# Patient Record
Sex: MALE | URBAN - METROPOLITAN AREA
[De-identification: names, ages, dates, MRNs, and addresses within clinical notes are randomized per-mention and may not be internally consistent; named-entity substitution may affect disease eponyms.]

---

## 2022-09-06 ENCOUNTER — LAB OUTSIDE AN ENCOUNTER (OUTPATIENT)
Dept: URBAN - METROPOLITAN AREA CLINIC 115 | Facility: CLINIC | Age: 57
End: 2022-09-06

## 2022-09-06 ENCOUNTER — OFFICE VISIT (OUTPATIENT)
Dept: URBAN - METROPOLITAN AREA CLINIC 115 | Facility: CLINIC | Age: 57
End: 2022-09-06
Payer: SELF-PAY

## 2022-09-06 VITALS
RESPIRATION RATE: 16 BRPM | HEIGHT: 62 IN | TEMPERATURE: 98.3 F | BODY MASS INDEX: 31.21 KG/M2 | SYSTOLIC BLOOD PRESSURE: 122 MMHG | HEART RATE: 61 BPM | DIASTOLIC BLOOD PRESSURE: 72 MMHG | WEIGHT: 169.6 LBS

## 2022-09-06 DIAGNOSIS — R19.5 POSITIVE FIT (FECAL IMMUNOCHEMICAL TEST): ICD-10-CM

## 2022-09-06 DIAGNOSIS — R10.32 LLQ ABDOMINAL PAIN: ICD-10-CM

## 2022-09-06 DIAGNOSIS — Z86.010 HISTORY OF COLON POLYPS: ICD-10-CM

## 2022-09-06 PROBLEM — 428283002: Status: ACTIVE | Noted: 2022-09-06

## 2022-09-06 PROBLEM — 59614000: Status: ACTIVE | Noted: 2022-09-06

## 2022-09-06 PROBLEM — 301716002: Status: ACTIVE | Noted: 2022-09-06

## 2022-09-06 PROCEDURE — 99202 OFFICE O/P NEW SF 15 MIN: CPT | Performed by: INTERNAL MEDICINE

## 2022-09-06 RX ORDER — LISINOPRIL 5 MG/1
1 TABLET TABLET ORAL ONCE A DAY
Status: ACTIVE | COMMUNITY

## 2022-09-06 NOTE — HPI-TODAY'S VISIT:
56 y/o  man with HTN,obesity,colon polyps in the past that came for eval given chronic intermittent LLQ pain and positive Fit test for CRC.Refer prior colonoscopy more than 10 years ago in [place] with polyps.Denies Gi bleeding or abnormal weight loss.No toxic habits.No abdominal surgeries.

## 2022-09-06 NOTE — PHYSICAL EXAM GASTROINTESTINAL
normal bowel sounds , no masses palpable , no guarding or rigidity , soft, nontender, nondistended no

## 2022-09-22 ENCOUNTER — OFFICE VISIT (OUTPATIENT)
Dept: URBAN - METROPOLITAN AREA SURGERY CENTER 13 | Facility: SURGERY CENTER | Age: 57
End: 2022-09-22
Payer: SELF-PAY

## 2022-09-22 ENCOUNTER — CLAIMS CREATED FROM THE CLAIM WINDOW (OUTPATIENT)
Dept: URBAN - METROPOLITAN AREA CLINIC 4 | Facility: CLINIC | Age: 57
End: 2022-09-22
Payer: SELF-PAY

## 2022-09-22 DIAGNOSIS — K63.89 BACTERIAL OVERGROWTH SYNDROME: ICD-10-CM

## 2022-09-22 DIAGNOSIS — K63.5 BENIGN COLON POLYP: ICD-10-CM

## 2022-09-22 DIAGNOSIS — R19.5 ABNORMAL CONSISTENCY OF STOOL: ICD-10-CM

## 2022-09-22 DIAGNOSIS — R10.32 ABDOMINAL CRAMPING IN LEFT LOWER QUADRANT: ICD-10-CM

## 2022-09-22 DIAGNOSIS — K63.89 OTHER SPECIFIED DISEASES OF INTESTINE: ICD-10-CM

## 2022-09-22 PROCEDURE — G8907 PT DOC NO EVENTS ON DISCHARG: HCPCS | Performed by: INTERNAL MEDICINE

## 2022-09-22 PROCEDURE — 45385 COLONOSCOPY W/LESION REMOVAL: CPT | Performed by: INTERNAL MEDICINE

## 2022-09-22 PROCEDURE — 88305 TISSUE EXAM BY PATHOLOGIST: CPT | Performed by: PATHOLOGY

## 2022-10-26 ENCOUNTER — TELEPHONE ENCOUNTER (OUTPATIENT)
Dept: URBAN - METROPOLITAN AREA CLINIC 92 | Facility: CLINIC | Age: 57
End: 2022-10-26

## 2024-03-06 ENCOUNTER — OV EP (OUTPATIENT)
Dept: URBAN - METROPOLITAN AREA CLINIC 25 | Facility: CLINIC | Age: 59
End: 2024-03-06
Payer: COMMERCIAL

## 2024-03-06 VITALS
HEART RATE: 58 BPM | TEMPERATURE: 97.9 F | BODY MASS INDEX: 30.73 KG/M2 | SYSTOLIC BLOOD PRESSURE: 127 MMHG | DIASTOLIC BLOOD PRESSURE: 72 MMHG | HEIGHT: 62 IN | WEIGHT: 167 LBS

## 2024-03-06 DIAGNOSIS — K59.04 CHRONIC IDIOPATHIC CONSTIPATION: ICD-10-CM

## 2024-03-06 DIAGNOSIS — R10.32 LLQ ABDOMINAL PAIN: ICD-10-CM

## 2024-03-06 PROBLEM — 82934008: Status: ACTIVE | Noted: 2024-03-06

## 2024-03-06 PROCEDURE — 99214 OFFICE O/P EST MOD 30 MIN: CPT

## 2024-03-06 RX ORDER — LISINOPRIL 5 MG/1
1 TABLET TABLET ORAL ONCE A DAY
Status: ACTIVE | COMMUNITY

## 2024-03-06 NOTE — HPI-TODAY'S VISIT:
03/24 OV  A language line  was offered and used. S/p colonoscopy in 09/22, recall advised in 5 years, presents today w/ complaints of intermittent LLQ abdominal pain, hx of diverticulosis, abd pain is intermittent, he notes cold or warm compress helps to alleviate the pain, pain is improved w/ a BM, pain is described as a burning discomfort. The patient has taken simethicone from [place] which has helped some, and has also tried an antispasmodic from another provider which has helped with the discomfort, unsure of the name. 2 BM's daily, notes he has hard bowels usually and notes increased abdominal discomfort w/ harder stools, bristol type 1 and 4. Denies BRBPR, melena, unintentional weight loss, N/V/D   Colon 22' - Hemorrhoids found on the perianal exam.- The examined portion of the ileum was normal.- Diverticulosis in the entire examined colon.- One 5 mm polyp in the transverse colon, was removed with a cold snare. Resected and retrieved.- One 4 mm polyp in the sigmoid colon, was removed with a cold snare. Resected and retrieved.- Non-bleeding external and internal hemorrhoids.

## 2024-03-06 NOTE — HPI-OTHER HISTORIES
08/22 OV Dr. Santoyo  58 y/o  man with HTN,obesity,colon polyps in the past that came for eval given chronic intermittent LLQ pain and positive Fit test for CRC.Refer prior colonoscopy more than 10 years ago in [place] with polyps.Denies Gi bleeding or abnormal weight loss.No toxic habits.No abdominal surgeries.

## 2024-03-08 ENCOUNTER — LAB (OUTPATIENT)
Dept: URBAN - METROPOLITAN AREA CLINIC 25 | Facility: CLINIC | Age: 59
End: 2024-03-08

## 2024-05-08 ENCOUNTER — DASHBOARD ENCOUNTERS (OUTPATIENT)
Age: 59
End: 2024-05-08

## 2024-05-08 ENCOUNTER — OFFICE VISIT (OUTPATIENT)
Dept: URBAN - METROPOLITAN AREA CLINIC 25 | Facility: CLINIC | Age: 59
End: 2024-05-08
Payer: COMMERCIAL

## 2024-05-08 VITALS
TEMPERATURE: 97.8 F | HEIGHT: 62 IN | WEIGHT: 166 LBS | HEART RATE: 59 BPM | BODY MASS INDEX: 30.55 KG/M2 | DIASTOLIC BLOOD PRESSURE: 70 MMHG | SYSTOLIC BLOOD PRESSURE: 120 MMHG

## 2024-05-08 DIAGNOSIS — Z86.010 HISTORY OF COLON POLYPS: ICD-10-CM

## 2024-05-08 DIAGNOSIS — R10.32 LLQ ABDOMINAL PAIN: ICD-10-CM

## 2024-05-08 DIAGNOSIS — K57.92 DIVERTICULITIS: ICD-10-CM

## 2024-05-08 PROBLEM — 307496006: Status: ACTIVE | Noted: 2024-05-08

## 2024-05-08 PROCEDURE — 99214 OFFICE O/P EST MOD 30 MIN: CPT

## 2024-05-08 RX ORDER — LISINOPRIL 5 MG/1
1 TABLET TABLET ORAL ONCE A DAY
Status: ACTIVE | COMMUNITY

## 2024-05-08 RX ORDER — POLYETHYLENE GLYCOL 3350, SODIUM SULFATE ANHYDROUS, SODIUM BICARBONATE, SODIUM CHLORIDE, POTASSIUM CHLORIDE 236; 22.74; 6.74; 5.86; 2.97 G/4L; G/4L; G/4L; G/4L; G/4L
AS DIRECTED POWDER, FOR SOLUTION ORAL
Qty: 1 | Refills: 0 | OUTPATIENT
Start: 2024-05-08 | End: 2024-05-09

## 2024-06-06 ENCOUNTER — CLAIMS CREATED FROM THE CLAIM WINDOW (OUTPATIENT)
Dept: URBAN - METROPOLITAN AREA CLINIC 4 | Facility: CLINIC | Age: 59
End: 2024-06-06
Payer: COMMERCIAL

## 2024-06-06 ENCOUNTER — CLAIMS CREATED FROM THE CLAIM WINDOW (OUTPATIENT)
Dept: URBAN - METROPOLITAN AREA SURGERY CENTER 20 | Facility: SURGERY CENTER | Age: 59
End: 2024-06-06
Payer: COMMERCIAL

## 2024-06-06 DIAGNOSIS — K63.5 BENIGN COLON POLYP: ICD-10-CM

## 2024-06-06 DIAGNOSIS — K57.30 ACQUIRED DIVERTICULOSIS OF COLON: ICD-10-CM

## 2024-06-06 DIAGNOSIS — K63.89 APPENDICITIS EPIPLOICA: ICD-10-CM

## 2024-06-06 DIAGNOSIS — R10.32 ABDOMINAL CRAMPING IN LEFT LOWER QUADRANT: ICD-10-CM

## 2024-06-06 DIAGNOSIS — K64.0 BLEEDING GRADE I HEMORRHOIDS: ICD-10-CM

## 2024-06-06 DIAGNOSIS — K63.5 POLYP OF COLON: ICD-10-CM

## 2024-06-06 PROCEDURE — 88305 TISSUE EXAM BY PATHOLOGIST: CPT | Performed by: PATHOLOGY

## 2024-06-06 PROCEDURE — G8907 PT DOC NO EVENTS ON DISCHARG: HCPCS | Performed by: INTERNAL MEDICINE

## 2024-06-06 PROCEDURE — 00811 ANES LWR INTST NDSC NOS: CPT | Performed by: NURSE ANESTHETIST, CERTIFIED REGISTERED

## 2024-06-06 PROCEDURE — 45380 COLONOSCOPY AND BIOPSY: CPT | Performed by: INTERNAL MEDICINE

## 2024-06-06 RX ORDER — LISINOPRIL 5 MG/1
1 TABLET TABLET ORAL ONCE A DAY
Status: ACTIVE | COMMUNITY

## 2024-08-07 ENCOUNTER — OFFICE VISIT (OUTPATIENT)
Dept: URBAN - METROPOLITAN AREA CLINIC 25 | Facility: CLINIC | Age: 59
End: 2024-08-07
Payer: COMMERCIAL

## 2024-08-07 VITALS
SYSTOLIC BLOOD PRESSURE: 130 MMHG | HEIGHT: 62 IN | DIASTOLIC BLOOD PRESSURE: 71 MMHG | TEMPERATURE: 97.5 F | HEART RATE: 54 BPM | WEIGHT: 167.2 LBS | BODY MASS INDEX: 30.77 KG/M2

## 2024-08-07 DIAGNOSIS — K63.5 COLON POLYP, HYPERPLASTIC: ICD-10-CM

## 2024-08-07 DIAGNOSIS — K59.09 CHANGE IN BOWEL MOVEMENTS INTERMITTENT CONSTIPATION. URGENCY IN THE MORNING.: ICD-10-CM

## 2024-08-07 DIAGNOSIS — K57.30 COLON, DIVERTICULOSIS: ICD-10-CM

## 2024-08-07 DIAGNOSIS — R10.32 LLQ ABDOMINAL PAIN: ICD-10-CM

## 2024-08-07 PROCEDURE — 99214 OFFICE O/P EST MOD 30 MIN: CPT

## 2024-08-07 RX ORDER — LISINOPRIL 5 MG/1
1 TABLET TABLET ORAL ONCE A DAY
Status: ACTIVE | COMMUNITY

## 2024-08-07 NOTE — HPI-TODAY'S VISIT:
08/24 OV A language line  was offered and used. S/p Colon revealed one 2mm HP polyp, non-bleeding internal hemorrhoids, and diverticulosis throughout the colon, LLQ abdominal discomfort has completely resolved, and the patient reports he continues to have hard stools occasionally which can sometimes cause associated lower abdominal discomfort, usually occurs when patient over eats. Hard stools occur 2-3x a week, the patient has started Miralax prn once every 2 weeks or so w/ good efficacy. Denies BRBPR, melena, unintentional weight loss, abdominal pain, N/V   Colon 2024 Non-bleeding internal hemorrhoids.- Diverticulosis in the entire examined colon. - One 2 mm polyp in the transverse colon, removed with a cold biopsy forceps.  Resected andretrieved. - Hyperplastic polyp  - The examined portion of the ileum was normal

## 2024-08-07 NOTE — HPI-OTHER HISTORIES
05/24 OV A language line  was offered and used. S/p CT a/p which revealed diverticulitis, the patient was started on Cipro/Flagyl last month, LLQ abdominal pain has improved, he notes he will still have some intermittent discomfort which occurs usually when having harder BM's and gets better w/ a BM. 3 hard BM's a week, patient has not tried any laxatives. Deneis BRBPR, melena, unintentional weight loss, N/V, or Family h/o colon cancer in his paternal half brother, mother w/ polyps. Denies cardiac hardware, dialysis, blood thinner use, and or issues with anesthesia      CT a/p 2024  - Extensive left colonic diverticulosis. Minimal wall thickening of the sigmoid colon could be secondary to mild colitis or diverticulitis. No focal collection of free air. - A 4 mm nonobstructing right lower pole renal calculus without hydronephrosis.  03/24 OV  A language line  was offered and used. S/p colonoscopy in 09/22, recall advised in 5 years, presents today w/ complaints of intermittent LLQ abdominal pain, hx of diverticulosis, abd pain is intermittent, he notes cold or warm compress helps to alleviate the pain, pain is improved w/ a BM, pain is described as a burning discomfort. The patient has taken simethicone from [place] which has helped some, and has also tried an antispasmodic from another provider which has helped with the discomfort, unsure of the name. 2 BM's daily, notes he has hard bowels usually and notes increased abdominal discomfort w/ harder stools, bristol type 1 and 4. Denies BRBPR, melena, unintentional weight loss, N/V/D   Colon 22' - Hemorrhoids found on the perianal exam.- The examined portion of the ileum was normal.- Diverticulosis in the entire examined colon.- One 5 mm polyp in the transverse colon, was removed with a cold snare. Resected and retrieved.- One 4 mm polyp in the sigmoid colon, was removed with a cold snare. Resected and retrieved.- Non-bleeding external and internal hemorrhoids.  08/22 OV Dr. Santoyo  58 y/o  man with HTN,obesity,colon polyps in the past that came for eval given chronic intermittent LLQ pain and positive Fit test for CRC.Refer prior colonoscopy more than 10 years ago in [place] with polyps.Denies Gi bleeding or abnormal weight loss.No toxic habits.No abdominal surgeries.

## 2024-12-11 ENCOUNTER — OFFICE VISIT (OUTPATIENT)
Dept: URBAN - METROPOLITAN AREA CLINIC 25 | Facility: CLINIC | Age: 59
End: 2024-12-11
Payer: COMMERCIAL

## 2024-12-11 VITALS
BODY MASS INDEX: 31.5 KG/M2 | SYSTOLIC BLOOD PRESSURE: 131 MMHG | WEIGHT: 171.2 LBS | HEART RATE: 67 BPM | HEIGHT: 62 IN | TEMPERATURE: 98.1 F | DIASTOLIC BLOOD PRESSURE: 74 MMHG

## 2024-12-11 DIAGNOSIS — K59.04 CHRONIC IDIOPATHIC CONSTIPATION: ICD-10-CM

## 2024-12-11 DIAGNOSIS — Z86.0102 HISTORY OF HYPERPLASTIC POLYP OF COLON: ICD-10-CM

## 2024-12-11 PROCEDURE — 99213 OFFICE O/P EST LOW 20 MIN: CPT

## 2024-12-11 RX ORDER — LUBIPROSTONE 24 UG/1
1 CAPSULE WITH FOOD AND WATER CAPSULE, GELATIN COATED ORAL TWICE A DAY
Qty: 180 CAPSULE | Refills: 3 | OUTPATIENT
Start: 2024-12-11 | End: 2025-12-06

## 2024-12-11 RX ORDER — LISINOPRIL 5 MG/1
1 TABLET TABLET ORAL ONCE A DAY
Status: ACTIVE | COMMUNITY

## 2024-12-11 NOTE — HPI-TODAY'S VISIT:
12/24 OV A language line  was offered and used. LLQ abdominal pain resolved, mild constipation occasionally, daily BM, however, miralax prn w/ mild efficacy, notes it takes 2 days to work. [place] stool 3/4 most days. Admits to incomplete evacuation. No BRBPR, melena, unintentional weigth loss, abdominal pain, N/V

## 2024-12-11 NOTE — HPI-OTHER HISTORIES
08/24 OV A language line  was offered and used. S/p Colon revealed one 2mm HP polyp, non-bleeding internal hemorrhoids, and diverticulosis throughout the colon, LLQ abdominal discomfort has completely resolved, and the patient reports he continues to have hard stools occasionally which can sometimes cause associated lower abdominal discomfort, usually occurs when patient over eats. Hard stools occur 2-3x a week, the patient has started Miralax prn once every 2 weeks or so w/ good efficacy. Denies BRBPR, melena, unintentional weight loss, abdominal pain, N/V   Colon 2024 Non-bleeding internal hemorrhoids.- Diverticulosis in the entire examined colon. - One 2 mm polyp in the transverse colon, removed with a cold biopsy forceps. Resected andretrieved. - Hyperplastic polyp  - The examined portion of the ileum was normal  05/24 OV A language line  was offered and used. S/p CT a/p which revealed diverticulitis, the patient was started on Cipro/Flagyl last month, LLQ abdominal pain has improved, he notes he will still have some intermittent discomfort which occurs usually when having harder BM's and gets better w/ a BM. 3 hard BM's a week, patient has not tried any laxatives. Deneis BRBPR, melena, unintentional weight loss, N/V, or Family h/o colon cancer in his paternal half brother, mother w/ polyps. Denies cardiac hardware, dialysis, blood thinner use, and or issues with anesthesia      CT a/p 2024  - Extensive left colonic diverticulosis. Minimal wall thickening of the sigmoid colon could be secondary to mild colitis or diverticulitis. No focal collection of free air. - A 4 mm nonobstructing right lower pole renal calculus without hydronephrosis.  03/24 OV  A language line  was offered and used. S/p colonoscopy in 09/22, recall advised in 5 years, presents today w/ complaints of intermittent LLQ abdominal pain, hx of diverticulosis, abd pain is intermittent, he notes cold or warm compress helps to alleviate the pain, pain is improved w/ a BM, pain is described as a burning discomfort. The patient has taken simethicone from [place] which has helped some, and has also tried an antispasmodic from another provider which has helped with the discomfort, unsure of the name. 2 BM's daily, notes he has hard bowels usually and notes increased abdominal discomfort w/ harder stools, bristol type 1 and 4. Denies BRBPR, melena, unintentional weight loss, N/V/D   Colon 22' - Hemorrhoids found on the perianal exam.- The examined portion of the ileum was normal.- Diverticulosis in the entire examined colon.- One 5 mm polyp in the transverse colon, was removed with a cold snare. Resected and retrieved.- One 4 mm polyp in the sigmoid colon, was removed with a cold snare. Resected and retrieved.- Non-bleeding external and internal hemorrhoids.  08/22 OV Dr. Santoyo  58 y/o  man with HTN,obesity,colon polyps in the past that came for eval given chronic intermittent LLQ pain and positive Fit test for CRC.Refer prior colonoscopy more than 10 years ago in [place] with polyps.Denies Gi bleeding or abnormal weight loss.No toxic habits.No abdominal surgeries.

## 2025-03-12 ENCOUNTER — LAB OUTSIDE AN ENCOUNTER (OUTPATIENT)
Dept: URBAN - METROPOLITAN AREA CLINIC 25 | Facility: CLINIC | Age: 60
End: 2025-03-12

## 2025-03-12 ENCOUNTER — OFFICE VISIT (OUTPATIENT)
Dept: URBAN - METROPOLITAN AREA CLINIC 25 | Facility: CLINIC | Age: 60
End: 2025-03-12
Payer: COMMERCIAL

## 2025-03-12 VITALS
WEIGHT: 171.2 LBS | DIASTOLIC BLOOD PRESSURE: 76 MMHG | HEART RATE: 63 BPM | BODY MASS INDEX: 31.5 KG/M2 | SYSTOLIC BLOOD PRESSURE: 138 MMHG | TEMPERATURE: 98.8 F | HEIGHT: 62 IN

## 2025-03-12 DIAGNOSIS — R10.32 LLQ ABDOMINAL PAIN: ICD-10-CM

## 2025-03-12 DIAGNOSIS — Z87.19 HISTORY OF DIVERTICULITIS: ICD-10-CM

## 2025-03-12 DIAGNOSIS — K64.8 INTERNAL HEMORRHOIDS: ICD-10-CM

## 2025-03-12 DIAGNOSIS — Z86.0102 HISTORY OF HYPERPLASTIC POLYP OF COLON: ICD-10-CM

## 2025-03-12 PROCEDURE — 99214 OFFICE O/P EST MOD 30 MIN: CPT

## 2025-03-12 RX ORDER — LISINOPRIL 5 MG/1
1 TABLET TABLET ORAL ONCE A DAY
Status: ACTIVE | COMMUNITY

## 2025-03-12 RX ORDER — LUBIPROSTONE 24 UG/1
1 CAPSULE WITH FOOD AND WATER CAPSULE, GELATIN COATED ORAL TWICE A DAY
Qty: 180 CAPSULE | Refills: 3 | Status: ACTIVE | COMMUNITY
Start: 2024-12-11 | End: 2025-12-06

## 2025-03-12 NOTE — HPI-TODAY'S VISIT:
03/25 OV A language line  was offered and used.  Trial of Amitiza 24mcg BID, patient reports he has been taking it sparingly prn d/t the side effect of diarrhea, notes complete evacuation w/ the rx, currently patient is having a BM daily, notes he is having more complete evacuation w/ most bowels w/ dietary changes w/ increased fiber. Denies BRBPR, melena, unintentional wt loss, abdominal pain, however, patient does report intermittent LLQ abdominal discomfort usually in the mornings or evenings, usually occurs in the AM, discomfort is a 2/10, usually wors after a meal, patient reports discomfort is different from when he had a diverticulitis episode.

## 2025-03-12 NOTE — HPI-OTHER HISTORIES
12/24 OV A language line  was offered and used. LLQ abdominal pain resolved, mild constipation occasionally, daily BM, however, miralax prn w/ mild efficacy, notes it takes 2 days to work. [place] stool 3/4 most days. Admits to incomplete evacuation. No BRBPR, melena, unintentional weigth loss, abdominal pain, N/V  08/24 OV A language line  was offered and used. S/p Colon revealed one 2mm HP polyp, non-bleeding internal hemorrhoids, and diverticulosis throughout the colon, LLQ abdominal discomfort has completely resolved, and the patient reports he continues to have hard stools occasionally which can sometimes cause associated lower abdominal discomfort, usually occurs when patient over eats. Hard stools occur 2-3x a week, the patient has started Miralax prn once every 2 weeks or so w/ good efficacy. Denies BRBPR, melena, unintentional weight loss, abdominal pain, N/V   Colon 2024 Non-bleeding internal hemorrhoids.- Diverticulosis in the entire examined colon. - One 2 mm polyp in the transverse colon, removed with a cold biopsy forceps. Resected andretrieved. - Hyperplastic polyp  - The examined portion of the ileum was normal  05/24 OV A language line  was offered and used. S/p CT a/p which revealed diverticulitis, the patient was started on Cipro/Flagyl last month, LLQ abdominal pain has improved, he notes he will still have some intermittent discomfort which occurs usually when having harder BM's and gets better w/ a BM. 3 hard BM's a week, patient has not tried any laxatives. Deneis BRBPR, melena, unintentional weight loss, N/V, or Family h/o colon cancer in his paternal half brother, mother w/ polyps. Denies cardiac hardware, dialysis, blood thinner use, and or issues with anesthesia      CT a/p 2024  - Extensive left colonic diverticulosis. Minimal wall thickening of the sigmoid colon could be secondary to mild colitis or diverticulitis. No focal collection of free air. - A 4 mm nonobstructing right lower pole renal calculus without hydronephrosis.  03/24 OV  A language line  was offered and used. S/p colonoscopy in 09/22, recall advised in 5 years, presents today w/ complaints of intermittent LLQ abdominal pain, hx of diverticulosis, abd pain is intermittent, he notes cold or warm compress helps to alleviate the pain, pain is improved w/ a BM, pain is described as a burning discomfort. The patient has taken simethicone from [place] which has helped some, and has also tried an antispasmodic from another provider which has helped with the discomfort, unsure of the name. 2 BM's daily, notes he has hard bowels usually and notes increased abdominal discomfort w/ harder stools, bristol type 1 and 4. Denies BRBPR, melena, unintentional weight loss, N/V/D   Colon 22' - Hemorrhoids found on the perianal exam.- The examined portion of the ileum was normal.- Diverticulosis in the entire examined colon.- One 5 mm polyp in the transverse colon, was removed with a cold snare. Resected and retrieved.- One 4 mm polyp in the sigmoid colon, was removed with a cold snare. Resected and retrieved.- Non-bleeding external and internal hemorrhoids.  08/22 OV Dr. Santoyo  58 y/o  man with HTN,obesity,colon polyps in the past that came for eval given chronic intermittent LLQ pain and positive Fit test for CRC.Refer prior colonoscopy more than 10 years ago in [place] with polyps.Denies Gi bleeding or abnormal weight loss.No toxic habits.No abdominal surgeries.

## 2025-06-11 ENCOUNTER — OFFICE VISIT (OUTPATIENT)
Dept: URBAN - METROPOLITAN AREA CLINIC 25 | Facility: CLINIC | Age: 60
End: 2025-06-11

## 2025-06-11 RX ORDER — LUBIPROSTONE 24 UG/1
1 CAPSULE WITH FOOD AND WATER CAPSULE, GELATIN COATED ORAL TWICE A DAY
Qty: 180 CAPSULE | Refills: 3 | Status: ACTIVE | COMMUNITY
Start: 2024-12-11 | End: 2025-12-06

## 2025-06-11 RX ORDER — LISINOPRIL 5 MG/1
1 TABLET TABLET ORAL ONCE A DAY
Status: ACTIVE | COMMUNITY

## 2025-06-12 ENCOUNTER — OFFICE VISIT (OUTPATIENT)
Dept: URBAN - METROPOLITAN AREA CLINIC 25 | Facility: CLINIC | Age: 60
End: 2025-06-12
Payer: COMMERCIAL

## 2025-06-12 DIAGNOSIS — Z86.0102 HISTORY OF HYPERPLASTIC POLYP OF COLON: ICD-10-CM

## 2025-06-12 DIAGNOSIS — Z87.19 HX OF DIVERTICULITIS OF COLON: ICD-10-CM

## 2025-06-12 DIAGNOSIS — K59.04 CHRONIC IDIOPATHIC CONSTIPATION: ICD-10-CM

## 2025-06-12 DIAGNOSIS — K64.8 INTERNAL HEMORRHOIDS: ICD-10-CM

## 2025-06-12 PROCEDURE — 99213 OFFICE O/P EST LOW 20 MIN: CPT

## 2025-06-12 RX ORDER — LUBIPROSTONE 24 UG/1
1 CAPSULE WITH FOOD AND WATER CAPSULE, GELATIN COATED ORAL TWICE A DAY
Qty: 180 CAPSULE | Refills: 3 | OUTPATIENT
Start: 2025-06-12 | End: 2026-06-07

## 2025-06-12 RX ORDER — LISINOPRIL 5 MG/1
1 TABLET TABLET ORAL ONCE A DAY
Status: ACTIVE | COMMUNITY

## 2025-06-12 RX ORDER — LUBIPROSTONE 24 UG/1
1 CAPSULE WITH FOOD AND WATER CAPSULE, GELATIN COATED ORAL TWICE A DAY
Qty: 180 CAPSULE | Refills: 3 | Status: ACTIVE | COMMUNITY
Start: 2024-12-11 | End: 2025-12-06

## 2025-06-12 NOTE — HPI-OTHER HISTORIES
03/25 OV A language line  was offered and used. Trial of Amitiza 24mcg BID, patient reports he has been taking it sparingly prn d/t the side effect of diarrhea, notes complete evacuation w/ the rx, currently patient is having a BM daily, notes he is having more complete evacuation w/ most bowels w/ dietary changes w/ increased fiber. Denies BRBPR, melena, unintentional wt loss, abdominal pain, however, patient does report intermittent LLQ abdominal discomfort usually in the mornings or evenings, usually occurs in the AM, discomfort is a 2/10, usually wors after a meal, patient reports discomfort is different from when he had a diverticulitis episode.   12/24 OV A language line  was offered and used. LLQ abdominal pain resolved, mild constipation occasionally, daily BM, however, miralax prn w/ mild efficacy, notes it takes 2 days to work. [place] stool 3/4 most days. Admits to incomplete evacuation. No BRBPR, melena, unintentional weigth loss, abdominal pain, N/V  08/24 OV A language line  was offered and used. S/p Colon revealed one 2mm HP polyp, non-bleeding internal hemorrhoids, and diverticulosis throughout the colon, LLQ abdominal discomfort has completely resolved, and the patient reports he continues to have hard stools occasionally which can sometimes cause associated lower abdominal discomfort, usually occurs when patient over eats. Hard stools occur 2-3x a week, the patient has started Miralax prn once every 2 weeks or so w/ good efficacy. Denies BRBPR, melena, unintentional weight loss, abdominal pain, N/V   Colon 2024 Non-bleeding internal hemorrhoids.- Diverticulosis in the entire examined colon. - One 2 mm polyp in the transverse colon, removed with a cold biopsy forceps. Resected andretrieved. - Hyperplastic polyp  - The examined portion of the ileum was normal  05/24 OV A language line  was offered and used. S/p CT a/p which revealed diverticulitis, the patient was started on Cipro/Flagyl last month, LLQ abdominal pain has improved, he notes he will still have some intermittent discomfort which occurs usually when having harder BM's and gets better w/ a BM. 3 hard BM's a week, patient has not tried any laxatives. Deneis BRBPR, melena, unintentional weight loss, N/V, or Family h/o colon cancer in his paternal half brother, mother w/ polyps. Denies cardiac hardware, dialysis, blood thinner use, and or issues with anesthesia      CT a/p 2024  - Extensive left colonic diverticulosis. Minimal wall thickening of the sigmoid colon could be secondary to mild colitis or diverticulitis. No focal collection of free air. - A 4 mm nonobstructing right lower pole renal calculus without hydronephrosis.  03/24 OV  A language line  was offered and used. S/p colonoscopy in 09/22, recall advised in 5 years, presents today w/ complaints of intermittent LLQ abdominal pain, hx of diverticulosis, abd pain is intermittent, he notes cold or warm compress helps to alleviate the pain, pain is improved w/ a BM, pain is described as a burning discomfort. The patient has taken simethicone from [place] which has helped some, and has also tried an antispasmodic from another provider which has helped with the discomfort, unsure of the name. 2 BM's daily, notes he has hard bowels usually and notes increased abdominal discomfort w/ harder stools, bristol type 1 and 4. Denies BRBPR, melena, unintentional weight loss, N/V/D   Colon 22' - Hemorrhoids found on the perianal exam.- The examined portion of the ileum was normal.- Diverticulosis in the entire examined colon.- One 5 mm polyp in the transverse colon, was removed with a cold snare. Resected and retrieved.- One 4 mm polyp in the sigmoid colon, was removed with a cold snare. Resected and retrieved.- Non-bleeding external and internal hemorrhoids.  08/22 OV Dr. Santoyo  58 y/o  man with HTN,obesity,colon polyps in the past that came for eval given chronic intermittent LLQ pain and positive Fit test for CRC.Refer prior colonoscopy more than 10 years ago in [place] with polyps.Denies Gi bleeding or abnormal weight loss.No toxic habits.No abdominal surgeries.